# Patient Record
Sex: MALE | Race: WHITE | NOT HISPANIC OR LATINO | URBAN - METROPOLITAN AREA
[De-identification: names, ages, dates, MRNs, and addresses within clinical notes are randomized per-mention and may not be internally consistent; named-entity substitution may affect disease eponyms.]

---

## 2024-05-03 ENCOUNTER — NURSING HOME VISIT (OUTPATIENT)
Dept: POST ACUTE CARE | Facility: EXTERNAL LOCATION | Age: 74
End: 2024-05-03
Payer: MEDICARE

## 2024-05-03 VITALS
OXYGEN SATURATION: 95 % | TEMPERATURE: 98.3 F | DIASTOLIC BLOOD PRESSURE: 63 MMHG | HEART RATE: 88 BPM | RESPIRATION RATE: 18 BRPM | SYSTOLIC BLOOD PRESSURE: 134 MMHG

## 2024-05-03 DIAGNOSIS — I48.20 CHRONIC ATRIAL FIBRILLATION (MULTI): ICD-10-CM

## 2024-05-03 DIAGNOSIS — R31.0 GROSS HEMATURIA: ICD-10-CM

## 2024-05-03 DIAGNOSIS — G31.83 LEWY BODY DEMENTIA WITH BEHAVIORAL DISTURBANCE (MULTI): Primary | ICD-10-CM

## 2024-05-03 DIAGNOSIS — R64 CACHEXIA (MULTI): ICD-10-CM

## 2024-05-03 DIAGNOSIS — F02.818 LEWY BODY DEMENTIA WITH BEHAVIORAL DISTURBANCE (MULTI): Primary | ICD-10-CM

## 2024-05-03 DIAGNOSIS — E05.90 HYPERTHYROIDISM: ICD-10-CM

## 2024-05-03 DIAGNOSIS — R62.7 FAILURE TO THRIVE IN ADULT: ICD-10-CM

## 2024-05-03 PROBLEM — R31.9 HEMATURIA: Status: ACTIVE | Noted: 2024-05-03

## 2024-05-03 PROBLEM — I10 PRIMARY HYPERTENSION: Status: ACTIVE | Noted: 2024-05-03

## 2024-05-03 PROCEDURE — 99310 SBSQ NF CARE HIGH MDM 45: CPT | Performed by: NURSE PRACTITIONER

## 2024-05-03 NOTE — ASSESSMENT & PLAN NOTE
Had some gross hematuria which promoted hospitalization,  has previously known 2mm nonobstructing right renal calculus from CT 2/24.   Xarelto was held, no known recurrence of hematuria, if recurs may need to consider urology evaluation.  Staff to monitor and notify for any recurrence of  hematuria

## 2024-05-03 NOTE — ASSESSMENT & PLAN NOTE
On rivaroxaban and amiodarone.  Rivaroxaban was temporarily held in the hospital due to gross hematuria which has resolved.   Staff to monitor for any recurrence of bleeding.   Rate currently controlled.

## 2024-05-03 NOTE — ASSESSMENT & PLAN NOTE
Longstanding amiodarone use.  Was recently started on methimazole (started during hospitalization 4/15-4/17), unknown what his baseline TSH and T4 are but this may be contributing to sx.  [facility].  Consider rechecking TSH and free T4 in a few weeks.

## 2024-05-03 NOTE — PROGRESS NOTES
Huong Almeida is a 73 y.o. male Here for skilled admission due to failure to thrive, hematuria.   HPI  He has had multiple hospitalizations  (5) in the last 5 months, he resides with his wife.  He has Lewy body dementia, oriented to person and has had decreased oral intake, frequent falls.  Most recent hospitalization 4/15/-4/17 he was found to have hyperthyroidism from longstanding amiodarone use and was started on methimazole.  HE had gross hematuria at home for a few days prompting evaluation, per notes no pathology identified for hematuria, xarelto was held and hematuria resolved, xarelto has been resumed.  He previously had CT abd which showed 2mm nonobstructing renal calculi.  He has been intermittently agitated and resistant to care.  He is cachectic.  Oriented to person, will open eyes with verbal stimulation.  He will answer yes/no.  Unable to obtain any hx except from chart.  Past medical hx includes AF on xarelto, recent dx of hypoerthyroidism, Lewy Body dementia, HTN.      Labs:  4/22  WBC: 8.5  Hgb: 11.4  Hct: 34.6  Platelet: 179    Na: 140  K: 4.1  Cl: 106  Co2: 30  BUN: 30  Creatinine: 1.3      MEDS:  Amiodarone  Amlodipine  Sinemet  Escitalopram  Ferrous sulfate  Hydralazine  Lisinopril  Melatonin  Memantine  Methimazole (recently started)  Miralax  Seroquel  rivaroxaban        Review of Systems   Reason unable to perform ROS: unable to obtain, he denies any pain when asked.       Objective   /63   Pulse 88   Temp 36.8 °C (98.3 °F)   Resp 18   SpO2 95%     Physical Exam  Constitutional:       General: He is not in acute distress.     Comments: Frail, cachectic male resting in bed, arouses easily.     HENT:      Head: Normocephalic and atraumatic.      Comments: Bitemporal wasting  Eyes:      Conjunctiva/sclera: Conjunctivae normal.   Cardiovascular:      Rate and Rhythm: Normal rate and regular rhythm.      Heart sounds: Murmur (3/6 RUSB) heard.   Pulmonary:      Effort:  Pulmonary effort is normal. No respiratory distress.      Breath sounds: Normal breath sounds.   Abdominal:      General: Bowel sounds are normal. There is no distension.      Palpations: Abdomen is soft.      Tenderness: There is no abdominal tenderness.      Comments: Scaphoid     Musculoskeletal:      Right lower leg: No edema.      Left lower leg: No edema.      Comments: diffusely decreased muscle mass  Moves all extremities, weak   Skin:     General: Skin is warm and dry.   Neurological:      Mental Status: He is alert.   Psychiatric:         Behavior: Behavior normal.      Comments: Per staff was agitated and resistant to care last night.  No recurrence today         Assessment/Plan   Problem List Items Addressed This Visit       Hematuria     Had some gross hematuria which promoted hospitalization,  has previously known 2mm nonobstructing right renal calculus from CT 2/24.   Xarelto was held, no known recurrence of hematuria, if recurs may need to consider urology evaluation.  Staff to monitor and notify for any recurrence of  hematuria           Lewy body dementia with behavioral disturbance (Multi) - Primary     Oreinted times 1, will open eyes to verbal stimulation, followed simple commands inconsistently.  He resides with wife, has had 5 hospitalizations in the last 5 months.  He has had frequent falls and decreased oral intake along with increased confusion.  On seroquel.           Chronic atrial fibrillation (Multi)     On rivaroxaban and amiodarone.  Rivaroxaban was temporarily held in the hospital due to gross hematuria which has resolved.   Staff to monitor for any recurrence of bleeding.   Rate currently controlled.          Failure to thrive in adult     Unclear weight trend, per notes he has had decreased oral intake.  No clear etiology, likely progression of Lewy Body dementia         Cachexia (Multi)     Weight pending from admission.          Hyperthyroidism     Longstanding amiodarone use.   Was recently started on methimazole (started during hospitalization 4/15-4/17), unknown what his baseline TSH and T4 are but this may be contributing to sx.  [facility].  Consider rechecking TSH and free T4 in a few weeks.         labs/meds/orders reviewed  staff to monitor and notify for any changes.  Hospital records reviewed.  continue with therapy as able.  He appears weak  Await weights and also PSA and TSH requested from [facility].    Time for coordination of care was greater than 35 minutes with hospital chart review, visit and exam, discussion of treatment plan with staff.     [Negative] : Heme/Lymph

## 2024-05-03 NOTE — LETTER
Patient: Barrera Almeida  : 1950    Encounter Date: 2024    Subjective  Barrera Almeida is a 73 y.o. male Here for skilled admission due to failure to thrive, hematuria.   HPI  He has had multiple hospitalizations  (5) in the last 5 months, he resides with his wife.  He has Lewy body dementia, oriented to person and has had decreased oral intake, frequent falls.  Most recent hospitalization 4/15/- he was found to have hyperthyroidism from longstanding amiodarone use and was started on methimazole.  HE had gross hematuria at home for a few days prompting evaluation, per notes no pathology identified for hematuria, xarelto was held and hematuria resolved, xarelto has been resumed.  He previously had CT abd which showed 2mm nonobstructing renal calculi.  He has been intermittently agitated and resistant to care.  He is cachectic.  Oriented to person, will open eyes with verbal stimulation.  He will answer yes/no.  Unable to obtain any hx except from chart.  Past medical hx includes AF on xarelto, recent dx of hypoerthyroidism, Lewy Body dementia, HTN.      Labs:    WBC: 8.5  Hgb: 11.4  Hct: 34.6  Platelet: 179    Na: 140  K: 4.1  Cl: 106  Co2: 30  BUN: 30  Creatinine: 1.3      MEDS:  Amiodarone  Amlodipine  Sinemet  Escitalopram  Ferrous sulfate  Hydralazine  Lisinopril  Melatonin  Memantine  Methimazole (recently started)  Miralax  Seroquel  rivaroxaban        Review of Systems   Reason unable to perform ROS: unable to obtain, he denies any pain when asked.       Objective  /63   Pulse 88   Temp 36.8 °C (98.3 °F)   Resp 18   SpO2 95%     Physical Exam  Constitutional:       General: He is not in acute distress.     Comments: Frail, cachectic male resting in bed, arouses easily.     HENT:      Head: Normocephalic and atraumatic.      Comments: Bitemporal wasting  Eyes:      Conjunctiva/sclera: Conjunctivae normal.   Cardiovascular:      Rate and Rhythm: Normal rate and regular rhythm.       Heart sounds: Murmur (3/6 RUSB) heard.   Pulmonary:      Effort: Pulmonary effort is normal. No respiratory distress.      Breath sounds: Normal breath sounds.   Abdominal:      General: Bowel sounds are normal. There is no distension.      Palpations: Abdomen is soft.      Tenderness: There is no abdominal tenderness.      Comments: Scaphoid     Musculoskeletal:      Right lower leg: No edema.      Left lower leg: No edema.      Comments: diffusely decreased muscle mass  Moves all extremities, weak   Skin:     General: Skin is warm and dry.   Neurological:      Mental Status: He is alert.   Psychiatric:         Behavior: Behavior normal.      Comments: Per staff was agitated and resistant to care last night.  No recurrence today         Assessment/Plan  Problem List Items Addressed This Visit       Hematuria     Had some gross hematuria which promoted hospitalization,  has previously known 2mm nonobstructing right renal calculus from CT 2/24.   Xarelto was held, no known recurrence of hematuria, if recurs may need to consider urology evaluation.  Staff to monitor and notify for any recurrence of  hematuria           Lewy body dementia with behavioral disturbance (Multi) - Primary     Oreinted times 1, will open eyes to verbal stimulation, followed simple commands inconsistently.  He resides with wife, has had 5 hospitalizations in the last 5 months.  He has had frequent falls and decreased oral intake along with increased confusion.  On seroquel.           Chronic atrial fibrillation (Multi)     On rivaroxaban and amiodarone.  Rivaroxaban was temporarily held in the hospital due to gross hematuria which has resolved.   Staff to monitor for any recurrence of bleeding.   Rate currently controlled.          Failure to thrive in adult     Unclear weight trend, per notes he has had decreased oral intake.  No clear etiology, likely progression of Lewy Body dementia         Cachexia (Multi)     Weight pending from  admission.          Hyperthyroidism     Longstanding amiodarone use.  Was recently started on methimazole (started during hospitalization 4/15-4/17), unknown what his baseline TSH and T4 are but this may be contributing to sx.  [facility].  Consider rechecking TSH and free T4 in a few weeks.         labs/meds/orders reviewed  staff to monitor and notify for any changes.  Hospital records reviewed.  continue with therapy as able.  He appears weak  Await weights and also PSA and TSH requested [facility].    Time for coordination of care was greater than 35 minutes with hospital chart review, visit and exam, discussion of treatment plan with staff.      Electronically Signed By: VIRY Feldman   5/3/24  2:37 PM

## 2024-05-03 NOTE — ASSESSMENT & PLAN NOTE
Oreinted times 1, will open eyes to verbal stimulation, followed simple commands inconsistently.  He resides with wife, has had 5 hospitalizations in the last 5 months.  He has had frequent falls and decreased oral intake along with increased confusion.  On seroquel.    Per notes he has had a fairly recent rapid decline.

## 2024-05-03 NOTE — ASSESSMENT & PLAN NOTE
Unclear weight trend, per notes he has had decreased oral intake.  No clear etiology, likely progression of Lewy Body dementia

## 2024-05-05 ENCOUNTER — APPOINTMENT (OUTPATIENT)
Dept: RADIOLOGY | Facility: HOSPITAL | Age: 74
DRG: 536 | End: 2024-05-05
Payer: MEDICARE

## 2024-05-05 ENCOUNTER — HOSPITAL ENCOUNTER (OUTPATIENT)
Facility: HOSPITAL | Age: 74
Setting detail: OBSERVATION
Discharge: HOSPICE | DRG: 536 | End: 2024-05-06
Attending: STUDENT IN AN ORGANIZED HEALTH CARE EDUCATION/TRAINING PROGRAM | Admitting: STUDENT IN AN ORGANIZED HEALTH CARE EDUCATION/TRAINING PROGRAM
Payer: MEDICARE

## 2024-05-05 DIAGNOSIS — S72.143S INTERTROCHANTERIC FRACTURE OF FEMUR, SEQUELA: Primary | ICD-10-CM

## 2024-05-05 LAB
ALBUMIN SERPL BCP-MCNC: 3.4 G/DL (ref 3.4–5)
ALP SERPL-CCNC: 83 U/L (ref 33–136)
ALT SERPL W P-5'-P-CCNC: 6 U/L (ref 10–52)
ANION GAP SERPL CALC-SCNC: 8 MMOL/L (ref 10–20)
AST SERPL W P-5'-P-CCNC: 14 U/L (ref 9–39)
BASOPHILS # BLD AUTO: 0.02 X10*3/UL (ref 0–0.1)
BASOPHILS NFR BLD AUTO: 0.2 %
BILIRUB SERPL-MCNC: 0.7 MG/DL (ref 0–1.2)
BUN SERPL-MCNC: 43 MG/DL (ref 6–23)
CALCIUM SERPL-MCNC: 9 MG/DL (ref 8.6–10.3)
CHLORIDE SERPL-SCNC: 107 MMOL/L (ref 98–107)
CO2 SERPL-SCNC: 28 MMOL/L (ref 21–32)
CREAT SERPL-MCNC: 1.05 MG/DL (ref 0.5–1.3)
EGFRCR SERPLBLD CKD-EPI 2021: 74 ML/MIN/1.73M*2
EOSINOPHIL # BLD AUTO: 0.01 X10*3/UL (ref 0–0.4)
EOSINOPHIL NFR BLD AUTO: 0.1 %
ERYTHROCYTE [DISTWIDTH] IN BLOOD BY AUTOMATED COUNT: 12.5 % (ref 11.5–14.5)
GLUCOSE SERPL-MCNC: 141 MG/DL (ref 74–99)
HCT VFR BLD AUTO: 33 % (ref 41–52)
HGB BLD-MCNC: 10.4 G/DL (ref 13.5–17.5)
IMM GRANULOCYTES # BLD AUTO: 0.05 X10*3/UL (ref 0–0.5)
IMM GRANULOCYTES NFR BLD AUTO: 0.4 % (ref 0–0.9)
INR PPP: 2.9 (ref 0.9–1.1)
LACTATE SERPL-SCNC: 0.8 MMOL/L (ref 0.4–2)
LYMPHOCYTES # BLD AUTO: 0.76 X10*3/UL (ref 0.8–3)
LYMPHOCYTES NFR BLD AUTO: 5.9 %
MAGNESIUM SERPL-MCNC: 1.97 MG/DL (ref 1.6–2.4)
MCH RBC QN AUTO: 27.7 PG (ref 26–34)
MCHC RBC AUTO-ENTMCNC: 31.5 G/DL (ref 32–36)
MCV RBC AUTO: 88 FL (ref 80–100)
MONOCYTES # BLD AUTO: 0.89 X10*3/UL (ref 0.05–0.8)
MONOCYTES NFR BLD AUTO: 6.9 %
NEUTROPHILS # BLD AUTO: 11.16 X10*3/UL (ref 1.6–5.5)
NEUTROPHILS NFR BLD AUTO: 86.5 %
NRBC BLD-RTO: 0 /100 WBCS (ref 0–0)
PLATELET # BLD AUTO: 196 X10*3/UL (ref 150–450)
POTASSIUM SERPL-SCNC: 3.7 MMOL/L (ref 3.5–5.3)
PROT SERPL-MCNC: 6.1 G/DL (ref 6.4–8.2)
PROTHROMBIN TIME: 33.6 SECONDS (ref 9.8–12.8)
RBC # BLD AUTO: 3.75 X10*6/UL (ref 4.5–5.9)
SODIUM SERPL-SCNC: 139 MMOL/L (ref 136–145)
WBC # BLD AUTO: 12.9 X10*3/UL (ref 4.4–11.3)

## 2024-05-05 PROCEDURE — 2500000004 HC RX 250 GENERAL PHARMACY W/ HCPCS (ALT 636 FOR OP/ED): Performed by: STUDENT IN AN ORGANIZED HEALTH CARE EDUCATION/TRAINING PROGRAM

## 2024-05-05 PROCEDURE — 85610 PROTHROMBIN TIME: CPT | Performed by: STUDENT IN AN ORGANIZED HEALTH CARE EDUCATION/TRAINING PROGRAM

## 2024-05-05 PROCEDURE — 73502 X-RAY EXAM HIP UNI 2-3 VIEWS: CPT | Mod: RIGHT SIDE | Performed by: RADIOLOGY

## 2024-05-05 PROCEDURE — 80053 COMPREHEN METABOLIC PANEL: CPT | Performed by: STUDENT IN AN ORGANIZED HEALTH CARE EDUCATION/TRAINING PROGRAM

## 2024-05-05 PROCEDURE — 72125 CT NECK SPINE W/O DYE: CPT

## 2024-05-05 PROCEDURE — 73502 X-RAY EXAM HIP UNI 2-3 VIEWS: CPT | Mod: RT

## 2024-05-05 PROCEDURE — G0378 HOSPITAL OBSERVATION PER HR: HCPCS

## 2024-05-05 PROCEDURE — 2500000004 HC RX 250 GENERAL PHARMACY W/ HCPCS (ALT 636 FOR OP/ED)

## 2024-05-05 PROCEDURE — 85025 COMPLETE CBC W/AUTO DIFF WBC: CPT | Performed by: STUDENT IN AN ORGANIZED HEALTH CARE EDUCATION/TRAINING PROGRAM

## 2024-05-05 PROCEDURE — 72125 CT NECK SPINE W/O DYE: CPT | Performed by: RADIOLOGY

## 2024-05-05 PROCEDURE — 1100000001 HC PRIVATE ROOM DAILY

## 2024-05-05 PROCEDURE — 2500000004 HC RX 250 GENERAL PHARMACY W/ HCPCS (ALT 636 FOR OP/ED): Performed by: INTERNAL MEDICINE

## 2024-05-05 PROCEDURE — 70450 CT HEAD/BRAIN W/O DYE: CPT | Performed by: RADIOLOGY

## 2024-05-05 PROCEDURE — 70450 CT HEAD/BRAIN W/O DYE: CPT

## 2024-05-05 PROCEDURE — 2500000001 HC RX 250 WO HCPCS SELF ADMINISTERED DRUGS (ALT 637 FOR MEDICARE OP): Performed by: STUDENT IN AN ORGANIZED HEALTH CARE EDUCATION/TRAINING PROGRAM

## 2024-05-05 PROCEDURE — 99285 EMERGENCY DEPT VISIT HI MDM: CPT | Mod: 25

## 2024-05-05 PROCEDURE — 99222 1ST HOSP IP/OBS MODERATE 55: CPT | Performed by: STUDENT IN AN ORGANIZED HEALTH CARE EDUCATION/TRAINING PROGRAM

## 2024-05-05 PROCEDURE — 36415 COLL VENOUS BLD VENIPUNCTURE: CPT | Performed by: STUDENT IN AN ORGANIZED HEALTH CARE EDUCATION/TRAINING PROGRAM

## 2024-05-05 PROCEDURE — 83735 ASSAY OF MAGNESIUM: CPT | Performed by: STUDENT IN AN ORGANIZED HEALTH CARE EDUCATION/TRAINING PROGRAM

## 2024-05-05 PROCEDURE — 83605 ASSAY OF LACTIC ACID: CPT | Performed by: STUDENT IN AN ORGANIZED HEALTH CARE EDUCATION/TRAINING PROGRAM

## 2024-05-05 PROCEDURE — 96374 THER/PROPH/DIAG INJ IV PUSH: CPT

## 2024-05-05 RX ORDER — CARBIDOPA AND LEVODOPA 50; 200 MG/1; MG/1
1 TABLET, EXTENDED RELEASE ORAL NIGHTLY
COMMUNITY

## 2024-05-05 RX ORDER — QUETIAPINE FUMARATE 25 MG/1
25 TABLET, FILM COATED ORAL NIGHTLY
Status: DISCONTINUED | OUTPATIENT
Start: 2024-05-05 | End: 2024-05-06 | Stop reason: HOSPADM

## 2024-05-05 RX ORDER — MEMANTINE HYDROCHLORIDE 10 MG/1
10 TABLET ORAL 2 TIMES DAILY
COMMUNITY

## 2024-05-05 RX ORDER — FERROUS SULFATE 325(65) MG
325 TABLET ORAL EVERY OTHER DAY
COMMUNITY

## 2024-05-05 RX ORDER — AMLODIPINE BESYLATE 5 MG/1
5 TABLET ORAL EVERY MORNING
Status: DISCONTINUED | OUTPATIENT
Start: 2024-05-05 | End: 2024-05-06 | Stop reason: HOSPADM

## 2024-05-05 RX ORDER — POLYETHYLENE GLYCOL 3350 17 G/17G
17 POWDER, FOR SOLUTION ORAL NIGHTLY
COMMUNITY

## 2024-05-05 RX ORDER — DOXYLAMINE SUCCINATE 25 MG
1 TABLET ORAL 3 TIMES DAILY
Status: DISCONTINUED | OUTPATIENT
Start: 2024-05-05 | End: 2024-05-06 | Stop reason: HOSPADM

## 2024-05-05 RX ORDER — HYDRALAZINE HYDROCHLORIDE 10 MG/1
10 TABLET, FILM COATED ORAL 2 TIMES DAILY
COMMUNITY

## 2024-05-05 RX ORDER — ACETAMINOPHEN 325 MG/1
650 TABLET ORAL EVERY 6 HOURS PRN
COMMUNITY

## 2024-05-05 RX ORDER — DOXYLAMINE SUCCINATE 25 MG
1 TABLET ORAL 3 TIMES DAILY
COMMUNITY

## 2024-05-05 RX ORDER — LISINOPRIL 40 MG/1
40 TABLET ORAL EVERY MORNING
COMMUNITY

## 2024-05-05 RX ORDER — CARBIDOPA AND LEVODOPA 25; 100 MG/1; MG/1
1 TABLET ORAL 2 TIMES DAILY
COMMUNITY

## 2024-05-05 RX ORDER — SODIUM CHLORIDE, SODIUM LACTATE, POTASSIUM CHLORIDE, CALCIUM CHLORIDE 600; 310; 30; 20 MG/100ML; MG/100ML; MG/100ML; MG/100ML
100 INJECTION, SOLUTION INTRAVENOUS CONTINUOUS
Status: ACTIVE | OUTPATIENT
Start: 2024-05-05 | End: 2024-05-06

## 2024-05-05 RX ORDER — HYDRALAZINE HYDROCHLORIDE 10 MG/1
10 TABLET, FILM COATED ORAL 2 TIMES DAILY
Status: DISCONTINUED | OUTPATIENT
Start: 2024-05-05 | End: 2024-05-06 | Stop reason: HOSPADM

## 2024-05-05 RX ORDER — ESCITALOPRAM OXALATE 10 MG/1
10 TABLET ORAL EVERY MORNING
Status: DISCONTINUED | OUTPATIENT
Start: 2024-05-05 | End: 2024-05-06 | Stop reason: HOSPADM

## 2024-05-05 RX ORDER — TALC
6 POWDER (GRAM) TOPICAL NIGHTLY
COMMUNITY

## 2024-05-05 RX ORDER — CARBIDOPA AND LEVODOPA 25; 100 MG/1; MG/1
1 TABLET ORAL
Status: DISCONTINUED | OUTPATIENT
Start: 2024-05-05 | End: 2024-05-06 | Stop reason: HOSPADM

## 2024-05-05 RX ORDER — MEMANTINE HYDROCHLORIDE 10 MG/1
10 TABLET ORAL 2 TIMES DAILY
Status: DISCONTINUED | OUTPATIENT
Start: 2024-05-05 | End: 2024-05-06 | Stop reason: HOSPADM

## 2024-05-05 RX ORDER — CARBIDOPA AND LEVODOPA 50; 200 MG/1; MG/1
1 TABLET, EXTENDED RELEASE ORAL NIGHTLY
Status: DISCONTINUED | OUTPATIENT
Start: 2024-05-05 | End: 2024-05-06 | Stop reason: HOSPADM

## 2024-05-05 RX ORDER — QUETIAPINE FUMARATE 25 MG/1
25 TABLET, FILM COATED ORAL NIGHTLY
COMMUNITY

## 2024-05-05 RX ORDER — ESCITALOPRAM OXALATE 10 MG/1
10 TABLET ORAL EVERY MORNING
COMMUNITY

## 2024-05-05 RX ORDER — POLYETHYLENE GLYCOL 3350 17 G/17G
17 POWDER, FOR SOLUTION ORAL NIGHTLY
Status: DISCONTINUED | OUTPATIENT
Start: 2024-05-05 | End: 2024-05-06 | Stop reason: HOSPADM

## 2024-05-05 RX ORDER — AMOXICILLIN 250 MG
1 CAPSULE ORAL 2 TIMES DAILY PRN
COMMUNITY

## 2024-05-05 RX ORDER — METHIMAZOLE 5 MG/1
5 TABLET ORAL 2 TIMES DAILY
Status: DISCONTINUED | OUTPATIENT
Start: 2024-05-05 | End: 2024-05-06 | Stop reason: HOSPADM

## 2024-05-05 RX ORDER — AMIODARONE HYDROCHLORIDE 100 MG/1
100 TABLET ORAL EVERY MORNING
COMMUNITY

## 2024-05-05 RX ORDER — LORAZEPAM 2 MG/ML
1 INJECTION INTRAMUSCULAR ONCE
Status: COMPLETED | OUTPATIENT
Start: 2024-05-05 | End: 2024-05-05

## 2024-05-05 RX ORDER — AMOXICILLIN 250 MG
1 CAPSULE ORAL 2 TIMES DAILY PRN
Status: DISCONTINUED | OUTPATIENT
Start: 2024-05-05 | End: 2024-05-06 | Stop reason: HOSPADM

## 2024-05-05 RX ORDER — METHIMAZOLE 5 MG/1
5 TABLET ORAL 2 TIMES DAILY
COMMUNITY

## 2024-05-05 RX ORDER — LISINOPRIL 40 MG/1
40 TABLET ORAL EVERY MORNING
Status: DISCONTINUED | OUTPATIENT
Start: 2024-05-05 | End: 2024-05-06 | Stop reason: HOSPADM

## 2024-05-05 RX ORDER — SODIUM CHLORIDE, SODIUM LACTATE, POTASSIUM CHLORIDE, CALCIUM CHLORIDE 600; 310; 30; 20 MG/100ML; MG/100ML; MG/100ML; MG/100ML
100 INJECTION, SOLUTION INTRAVENOUS CONTINUOUS
Status: ACTIVE | OUTPATIENT
Start: 2024-05-05 | End: 2024-05-05

## 2024-05-05 RX ORDER — AMIODARONE HYDROCHLORIDE 200 MG/1
100 TABLET ORAL EVERY MORNING
Status: DISCONTINUED | OUTPATIENT
Start: 2024-05-05 | End: 2024-05-06 | Stop reason: HOSPADM

## 2024-05-05 RX ORDER — AMLODIPINE BESYLATE 5 MG/1
5 TABLET ORAL EVERY MORNING
COMMUNITY

## 2024-05-05 RX ORDER — KETOROLAC TROMETHAMINE 15 MG/ML
15 INJECTION, SOLUTION INTRAMUSCULAR; INTRAVENOUS EVERY 6 HOURS
Status: DISCONTINUED | OUTPATIENT
Start: 2024-05-05 | End: 2024-05-06 | Stop reason: HOSPADM

## 2024-05-05 RX ORDER — TALC
6 POWDER (GRAM) TOPICAL NIGHTLY
Status: DISCONTINUED | OUTPATIENT
Start: 2024-05-05 | End: 2024-05-06 | Stop reason: HOSPADM

## 2024-05-05 RX ADMIN — MICONAZOLE NITRATE 1 APPLICATION: 20 CREAM TOPICAL at 14:35

## 2024-05-05 RX ADMIN — SODIUM CHLORIDE, POTASSIUM CHLORIDE, SODIUM LACTATE AND CALCIUM CHLORIDE 100 ML/HR: 600; 310; 30; 20 INJECTION, SOLUTION INTRAVENOUS at 13:38

## 2024-05-05 RX ADMIN — SODIUM CHLORIDE, POTASSIUM CHLORIDE, SODIUM LACTATE AND CALCIUM CHLORIDE 100 ML/HR: 600; 310; 30; 20 INJECTION, SOLUTION INTRAVENOUS at 06:01

## 2024-05-05 RX ADMIN — SODIUM CHLORIDE, POTASSIUM CHLORIDE, SODIUM LACTATE AND CALCIUM CHLORIDE 100 ML/HR: 600; 310; 30; 20 INJECTION, SOLUTION INTRAVENOUS at 23:06

## 2024-05-05 RX ADMIN — HYDROMORPHONE HYDROCHLORIDE 0.5 MG: 1 INJECTION, SOLUTION INTRAMUSCULAR; INTRAVENOUS; SUBCUTANEOUS at 13:42

## 2024-05-05 RX ADMIN — MICONAZOLE NITRATE 1 APPLICATION: 20 CREAM TOPICAL at 21:22

## 2024-05-05 RX ADMIN — LORAZEPAM 1 MG: 2 INJECTION, SOLUTION INTRAMUSCULAR; INTRAVENOUS at 12:15

## 2024-05-05 RX ADMIN — HYDROMORPHONE HYDROCHLORIDE 0.5 MG: 1 INJECTION, SOLUTION INTRAMUSCULAR; INTRAVENOUS; SUBCUTANEOUS at 06:01

## 2024-05-05 RX ADMIN — KETOROLAC TROMETHAMINE 15 MG: 15 INJECTION, SOLUTION INTRAMUSCULAR; INTRAVENOUS at 20:40

## 2024-05-05 RX ADMIN — KETOROLAC TROMETHAMINE 15 MG: 15 INJECTION, SOLUTION INTRAMUSCULAR; INTRAVENOUS at 14:40

## 2024-05-05 SDOH — SOCIAL STABILITY: SOCIAL INSECURITY: DO YOU FEEL UNSAFE GOING BACK TO THE PLACE WHERE YOU ARE LIVING?: UNABLE TO ASSESS

## 2024-05-05 SDOH — ECONOMIC STABILITY: INCOME INSECURITY
IN THE LAST 12 MONTHS, WAS THERE A TIME WHEN YOU WERE NOT ABLE TO PAY THE MORTGAGE OR RENT ON TIME?: PATIENT UNABLE TO ANSWER

## 2024-05-05 SDOH — HEALTH STABILITY: MENTAL HEALTH: HOW OFTEN DO YOU HAVE 6 OR MORE DRINKS ON ONE OCCASION?: NEVER

## 2024-05-05 SDOH — SOCIAL STABILITY: SOCIAL NETWORK: HOW OFTEN DO YOU ATTENT MEETINGS OF THE CLUB OR ORGANIZATION YOU BELONG TO?: PATIENT UNABLE TO ANSWER

## 2024-05-05 SDOH — ECONOMIC STABILITY: INCOME INSECURITY
HOW HARD IS IT FOR YOU TO PAY FOR THE VERY BASICS LIKE FOOD, HOUSING, MEDICAL CARE, AND HEATING?: PATIENT UNABLE TO ANSWER

## 2024-05-05 SDOH — SOCIAL STABILITY: SOCIAL INSECURITY
WITHIN THE LAST YEAR, HAVE YOU BEEN KICKED, HIT, SLAPPED, OR OTHERWISE PHYSICALLY HURT BY YOUR PARTNER OR EX-PARTNER?: PATIENT UNABLE TO ANSWER

## 2024-05-05 SDOH — SOCIAL STABILITY: SOCIAL INSECURITY: DOES ANYONE TRY TO KEEP YOU FROM HAVING/CONTACTING OTHER FRIENDS OR DOING THINGS OUTSIDE YOUR HOME?: UNABLE TO ASSESS

## 2024-05-05 SDOH — SOCIAL STABILITY: SOCIAL NETWORK: HOW OFTEN DO YOU ATTEND CHURCH OR RELIGIOUS SERVICES?: PATIENT UNABLE TO ANSWER

## 2024-05-05 SDOH — SOCIAL STABILITY: SOCIAL NETWORK: IN A TYPICAL WEEK, HOW MANY TIMES DO YOU TALK ON THE PHONE WITH FAMILY, FRIENDS, OR NEIGHBORS?: PATIENT UNABLE TO ANSWER

## 2024-05-05 SDOH — SOCIAL STABILITY: SOCIAL NETWORK: HOW OFTEN DO YOU GET TOGETHER WITH FRIENDS OR RELATIVES?: PATIENT UNABLE TO ANSWER

## 2024-05-05 SDOH — ECONOMIC STABILITY: INCOME INSECURITY
IN THE PAST 12 MONTHS, HAS THE ELECTRIC, GAS, OIL, OR WATER COMPANY THREATENED TO SHUT OFF SERVICE IN YOUR HOME?: PATIENT UNABLE TO ANSWER

## 2024-05-05 SDOH — HEALTH STABILITY: MENTAL HEALTH
HOW OFTEN DO YOU NEED TO HAVE SOMEONE HELP YOU WHEN YOU READ INSTRUCTIONS, PAMPHLETS, OR OTHER WRITTEN MATERIAL FROM YOUR DOCTOR OR PHARMACY?: PATIENT UNABLE TO RESPOND

## 2024-05-05 SDOH — SOCIAL STABILITY: SOCIAL INSECURITY: ABUSE: ADULT

## 2024-05-05 SDOH — SOCIAL STABILITY: SOCIAL NETWORK: ARE YOU MARRIED, WIDOWED, DIVORCED, SEPARATED, NEVER MARRIED, OR LIVING WITH A PARTNER?: PATIENT UNABLE TO ANSWER

## 2024-05-05 SDOH — SOCIAL STABILITY: SOCIAL NETWORK
DO YOU BELONG TO ANY CLUBS OR ORGANIZATIONS SUCH AS CHURCH GROUPS UNIONS, FRATERNAL OR ATHLETIC GROUPS, OR SCHOOL GROUPS?: PATIENT UNABLE TO ANSWER

## 2024-05-05 SDOH — ECONOMIC STABILITY: HOUSING INSECURITY: IN THE LAST 12 MONTHS, HOW MANY PLACES HAVE YOU LIVED?: 2

## 2024-05-05 SDOH — ECONOMIC STABILITY: HOUSING INSECURITY
IN THE LAST 12 MONTHS, WAS THERE A TIME WHEN YOU DID NOT HAVE A STEADY PLACE TO SLEEP OR SLEPT IN A SHELTER (INCLUDING NOW)?: PATIENT UNABLE TO ANSWER

## 2024-05-05 SDOH — SOCIAL STABILITY: SOCIAL INSECURITY: WERE YOU ABLE TO COMPLETE ALL THE BEHAVIORAL HEALTH SCREENINGS?: NO

## 2024-05-05 SDOH — HEALTH STABILITY: MENTAL HEALTH: HOW OFTEN DO YOU HAVE A DRINK CONTAINING ALCOHOL?: NEVER

## 2024-05-05 SDOH — SOCIAL STABILITY: SOCIAL INSECURITY: DO YOU FEEL ANYONE HAS EXPLOITED OR TAKEN ADVANTAGE OF YOU FINANCIALLY OR OF YOUR PERSONAL PROPERTY?: UNABLE TO ASSESS

## 2024-05-05 SDOH — SOCIAL STABILITY: SOCIAL INSECURITY
WITHIN THE LAST YEAR, HAVE TO BEEN RAPED OR FORCED TO HAVE ANY KIND OF SEXUAL ACTIVITY BY YOUR PARTNER OR EX-PARTNER?: PATIENT UNABLE TO ANSWER

## 2024-05-05 SDOH — SOCIAL STABILITY: SOCIAL INSECURITY: ARE THERE ANY APPARENT SIGNS OF INJURIES/BEHAVIORS THAT COULD BE RELATED TO ABUSE/NEGLECT?: UNABLE TO ASSESS

## 2024-05-05 SDOH — HEALTH STABILITY: MENTAL HEALTH
STRESS IS WHEN SOMEONE FEELS TENSE, NERVOUS, ANXIOUS, OR CAN'T SLEEP AT NIGHT BECAUSE THEIR MIND IS TROUBLED. HOW STRESSED ARE YOU?: PATIENT UNABLE TO ANSWER

## 2024-05-05 SDOH — ECONOMIC STABILITY: TRANSPORTATION INSECURITY
IN THE PAST 12 MONTHS, HAS THE LACK OF TRANSPORTATION KEPT YOU FROM MEDICAL APPOINTMENTS OR FROM GETTING MEDICATIONS?: PATIENT UNABLE TO ANSWER

## 2024-05-05 SDOH — SOCIAL STABILITY: SOCIAL INSECURITY: HAVE YOU HAD THOUGHTS OF HARMING ANYONE ELSE?: UNABLE TO ASSESS

## 2024-05-05 SDOH — HEALTH STABILITY: PHYSICAL HEALTH: ON AVERAGE, HOW MANY DAYS PER WEEK DO YOU ENGAGE IN MODERATE TO STRENUOUS EXERCISE (LIKE A BRISK WALK)?: 0 DAYS

## 2024-05-05 SDOH — HEALTH STABILITY: PHYSICAL HEALTH: ON AVERAGE, HOW MANY MINUTES DO YOU ENGAGE IN EXERCISE AT THIS LEVEL?: 0 MIN

## 2024-05-05 SDOH — SOCIAL STABILITY: SOCIAL INSECURITY
WITHIN THE LAST YEAR, HAVE YOU BEEN HUMILIATED OR EMOTIONALLY ABUSED IN OTHER WAYS BY YOUR PARTNER OR EX-PARTNER?: PATIENT UNABLE TO ANSWER

## 2024-05-05 SDOH — ECONOMIC STABILITY: FOOD INSECURITY
WITHIN THE PAST 12 MONTHS, YOU WORRIED THAT YOUR FOOD WOULD RUN OUT BEFORE YOU GOT MONEY TO BUY MORE.: PATIENT UNABLE TO ANSWER

## 2024-05-05 SDOH — ECONOMIC STABILITY: TRANSPORTATION INSECURITY
IN THE PAST 12 MONTHS, HAS LACK OF TRANSPORTATION KEPT YOU FROM MEETINGS, WORK, OR FROM GETTING THINGS NEEDED FOR DAILY LIVING?: PATIENT UNABLE TO ANSWER

## 2024-05-05 SDOH — SOCIAL STABILITY: SOCIAL INSECURITY: WITHIN THE LAST YEAR, HAVE YOU BEEN AFRAID OF YOUR PARTNER OR EX-PARTNER?: PATIENT UNABLE TO ANSWER

## 2024-05-05 SDOH — HEALTH STABILITY: MENTAL HEALTH: HOW MANY STANDARD DRINKS CONTAINING ALCOHOL DO YOU HAVE ON A TYPICAL DAY?: PATIENT DOES NOT DRINK

## 2024-05-05 SDOH — SOCIAL STABILITY: SOCIAL INSECURITY: HAVE YOU HAD ANY THOUGHTS OF HARMING ANYONE ELSE?: UNABLE TO ASSESS

## 2024-05-05 SDOH — SOCIAL STABILITY: SOCIAL INSECURITY: ARE YOU OR HAVE YOU BEEN THREATENED OR ABUSED PHYSICALLY, EMOTIONALLY, OR SEXUALLY BY ANYONE?: UNABLE TO ASSESS

## 2024-05-05 SDOH — SOCIAL STABILITY: SOCIAL INSECURITY: HAS ANYONE EVER THREATENED TO HURT YOUR FAMILY OR YOUR PETS?: UNABLE TO ASSESS

## 2024-05-05 SDOH — ECONOMIC STABILITY: FOOD INSECURITY
WITHIN THE PAST 12 MONTHS, THE FOOD YOU BOUGHT JUST DIDN'T LAST AND YOU DIDN'T HAVE MONEY TO GET MORE.: PATIENT UNABLE TO ANSWER

## 2024-05-05 ASSESSMENT — COGNITIVE AND FUNCTIONAL STATUS - GENERAL
PERSONAL GROOMING: TOTAL
HELP NEEDED FOR BATHING: TOTAL
TOILETING: TOTAL
TURNING FROM BACK TO SIDE WHILE IN FLAT BAD: TOTAL
EATING MEALS: TOTAL
MOBILITY SCORE: 6
MOBILITY SCORE: 6
EATING MEALS: TOTAL
MOVING TO AND FROM BED TO CHAIR: TOTAL
WALKING IN HOSPITAL ROOM: TOTAL
TURNING FROM BACK TO SIDE WHILE IN FLAT BAD: TOTAL
PERSONAL GROOMING: TOTAL
CLIMB 3 TO 5 STEPS WITH RAILING: TOTAL
DRESSING REGULAR LOWER BODY CLOTHING: TOTAL
STANDING UP FROM CHAIR USING ARMS: TOTAL
DRESSING REGULAR LOWER BODY CLOTHING: TOTAL
DAILY ACTIVITIY SCORE: 6
DRESSING REGULAR UPPER BODY CLOTHING: TOTAL
WALKING IN HOSPITAL ROOM: TOTAL
PATIENT BASELINE BEDBOUND: YES
DRESSING REGULAR UPPER BODY CLOTHING: TOTAL
CLIMB 3 TO 5 STEPS WITH RAILING: TOTAL
STANDING UP FROM CHAIR USING ARMS: TOTAL
DAILY ACTIVITIY SCORE: 6
MOVING FROM LYING ON BACK TO SITTING ON SIDE OF FLAT BED WITH BEDRAILS: TOTAL
HELP NEEDED FOR BATHING: TOTAL
MOVING TO AND FROM BED TO CHAIR: TOTAL
TOILETING: TOTAL
MOVING FROM LYING ON BACK TO SITTING ON SIDE OF FLAT BED WITH BEDRAILS: TOTAL

## 2024-05-05 ASSESSMENT — LIFESTYLE VARIABLES
SUBSTANCE_ABUSE_PAST_12_MONTHS: NO
HOW OFTEN DO YOU HAVE 6 OR MORE DRINKS ON ONE OCCASION: NEVER
TOTAL SCORE: 0
PRESCIPTION_ABUSE_PAST_12_MONTHS: NO
HAVE YOU EVER FELT YOU SHOULD CUT DOWN ON YOUR DRINKING: NO
SKIP TO QUESTIONS 9-10: 1
AUDIT-C TOTAL SCORE: 0
HOW MANY STANDARD DRINKS CONTAINING ALCOHOL DO YOU HAVE ON A TYPICAL DAY: PATIENT DOES NOT DRINK
EVER FELT BAD OR GUILTY ABOUT YOUR DRINKING: NO
AUDIT-C TOTAL SCORE: 0
AUDIT-C TOTAL SCORE: 0
HOW OFTEN DO YOU HAVE A DRINK CONTAINING ALCOHOL: NEVER
HAVE PEOPLE ANNOYED YOU BY CRITICIZING YOUR DRINKING: NO
SKIP TO QUESTIONS 9-10: 1
EVER HAD A DRINK FIRST THING IN THE MORNING TO STEADY YOUR NERVES TO GET RID OF A HANGOVER: NO

## 2024-05-05 ASSESSMENT — PAIN SCALES - PAIN ASSESSMENT IN ADVANCED DEMENTIA (PAINAD)
TOTALSCORE: 1
FACIALEXPRESSION: SMILING OR INEXPRESSIVE
BREATHING: NORMAL
FACIALEXPRESSION: SMILING OR INEXPRESSIVE
TOTALSCORE: 0
FACIALEXPRESSION: SMILING OR INEXPRESSIVE
CONSOLABILITY: DISTRACTED OR REASSURED BY VOICE/TOUCH
NEGVOCALIZATION: OCCASIONAL MOAN/GROAN, LOW SPEECH, NEGATIVE/DISAPPROVING QUALITY
BREATHING: NORMAL
BODYLANGUAGE: RELAXED
BREATHING: OCCASIONAL LABORED BREATHING, SHORT PERIOD OF HYPERVENTILATION
BODYLANGUAGE: RELAXED
CONSOLABILITY: NO NEED TO CONSOLE
BREATHING: OCCASIONAL LABORED BREATHING, SHORT PERIOD OF HYPERVENTILATION
BODYLANGUAGE: TENSE, DISTRESSED PACING, FIDGETING
CONSOLABILITY: DISTRACTED OR REASSURED BY VOICE/TOUCH
TOTALSCORE: DECREASED PAIN
TOTALSCORE: 0
BODYLANGUAGE: RELAXED
CONSOLABILITY: NO NEED TO CONSOLE
BREATHING: OCCASIONAL LABORED BREATHING, SHORT PERIOD OF HYPERVENTILATION
CONSOLABILITY: NO NEED TO CONSOLE
BREATHING: OCCASIONAL LABORED BREATHING, SHORT PERIOD OF HYPERVENTILATION
FACIALEXPRESSION: SAD, FRIGHTENED, FROWN
BODYLANGUAGE: RIGID, FISTS CLENCHED, KNEES UP, PUSHING/PULLING AWAY, STRIKES OUT
TOTALSCORE: 5
FACIALEXPRESSION: SMILING OR INEXPRESSIVE
TOTALSCORE: 0
CONSOLABILITY: NO NEED TO CONSOLE
BREATHING: NORMAL
CONSOLABILITY: NO NEED TO CONSOLE
TOTALSCORE: 1
TOTALSCORE: 7
FACIALEXPRESSION: FACIAL GRIMACING
NEGVOCALIZATION: OCCASIONAL MOAN/GROAN, LOW SPEECH, NEGATIVE/DISAPPROVING QUALITY
FACIALEXPRESSION: SMILING OR INEXPRESSIVE
BODYLANGUAGE: RELAXED
BODYLANGUAGE: RELAXED

## 2024-05-05 ASSESSMENT — PAIN SCALES - GENERAL: PAINLEVEL_OUTOF10: 0 - NO PAIN

## 2024-05-05 ASSESSMENT — ACTIVITIES OF DAILY LIVING (ADL)
ADEQUATE_TO_COMPLETE_ADL: NO
HEARING - LEFT EAR: FUNCTIONAL
ASSISTIVE_DEVICE: WHEELCHAIR;WALKER
FEEDING YOURSELF: DEPENDENT
DRESSING YOURSELF: DEPENDENT
TOILETING: DEPENDENT
GROOMING: DEPENDENT
PATIENT'S MEMORY ADEQUATE TO SAFELY COMPLETE DAILY ACTIVITIES?: NO
HEARING - RIGHT EAR: FUNCTIONAL
WALKS IN HOME: DEPENDENT
BATHING: DEPENDENT
JUDGMENT_ADEQUATE_SAFELY_COMPLETE_DAILY_ACTIVITIES: NO

## 2024-05-05 ASSESSMENT — COLUMBIA-SUICIDE SEVERITY RATING SCALE - C-SSRS
6. HAVE YOU EVER DONE ANYTHING, STARTED TO DO ANYTHING, OR PREPARED TO DO ANYTHING TO END YOUR LIFE?: NO
2. HAVE YOU ACTUALLY HAD ANY THOUGHTS OF KILLING YOURSELF?: NO
1. IN THE PAST MONTH, HAVE YOU WISHED YOU WERE DEAD OR WISHED YOU COULD GO TO SLEEP AND NOT WAKE UP?: NO

## 2024-05-05 ASSESSMENT — PAIN - FUNCTIONAL ASSESSMENT
PAIN_FUNCTIONAL_ASSESSMENT: PAINAD (PAIN ASSESSMENT IN ADVANCED DEMENTIA SCALE)
PAIN_FUNCTIONAL_ASSESSMENT: PAINAD (PAIN ASSESSMENT IN ADVANCED DEMENTIA SCALE)

## 2024-05-05 ASSESSMENT — PATIENT HEALTH QUESTIONNAIRE - PHQ9
SUM OF ALL RESPONSES TO PHQ9 QUESTIONS 1 & 2: 0
2. FEELING DOWN, DEPRESSED OR HOPELESS: NOT AT ALL
1. LITTLE INTEREST OR PLEASURE IN DOING THINGS: NOT AT ALL

## 2024-05-05 NOTE — NURSING NOTE
1215 Patient given iv ativan for tremors. Patient appears comfortable at this time. Patient unable to take oral meds.

## 2024-05-05 NOTE — CONSULTS
Reason For Consult  Right intertrochanteric femur fracture    History Of Present Illness  Barrera Almeida is a 73 y.o. male presenting with a right hip fracture.  Patient has a past medical history significant for severe Parkinson's/Lewy body dementia and atrial fibrillation for which he is on Xarelto.  [facility] where he has been admitted for 3 days.  He was reportedly up out of bed and when the nurses were trying to get him back in bed last night he fell.  He was brought to [facility] emergency department where he was found to have a right intertrochanteric femur fracture.  History is obtained from his family at bedside but the patient has had a major decline in his health status over the past few months with numerous falls.  His dementia is quite severe and he has experienced increased weight loss due to poor oral intake.  CT scan of the head and neck was performed in the emergency department and did not reveal any obvious bleeds or fractures.  Patient was admitted to a medical service with plans to discuss possible operative versus palliative care/hospice.  His family reports that they are still unsure of how they wish to proceed.     Past Medical History  He has a past medical history of Parkinson disease (Multi).    Surgical History  He has no past surgical history on file.     Social History  He has no history on file for tobacco use, alcohol use, and drug use.    Family History  No family history on file.     Allergies  Venlafaxine     Physical Exam  Patient is somewhat responsive to stimuli but is not alert and oriented.  He exhibits tremoring of all 4 extremities consistent with his Parkinson's.  There is no obvious ecchymosis or bruising around the right hip.  Patient does appear to experience some discomfort with logroll.  There does appear to be some tenderness to palpation  "about the hip but no obvious tenderness to palpation throughout the remainder of the right lower extremity.  There is no obvious point tenderness to palpation over the remainder of the extremities.  No obvious additional ecchymosis and bruising.  Patient appears to be spontaneously moving all 4 extremities.  DP pulses present in the right lower extremity.  Spontaneously plantar flexes and dorsiflexes the foot.  Unable to assess sensation secondary to patient condition.     Last Recorded Vitals  Blood pressure 114/69, pulse 67, temperature 37.1 °C (98.8 °F), temperature source Temporal, resp. rate 18, height 1.803 m (5' 11\"), weight 65.8 kg (145 lb), SpO2 99%.    Relevant Results  CT head wo IV contrast    Result Date: 5/5/2024  Interpreted By:  Finkelstein, Evan, STUDY: CT HEAD WO IV CONTRAST; CT CERVICAL SPINE WO IV CONTRAST;  5/5/2024 4:25 am   INDICATION: Signs/Symptoms:Fall head injury; Signs/Symptoms:Fall injury.   COMPARISON: None.   ACCESSION NUMBER(S): [id number]; [id number]   ORDERING CLINICIAN: DYAN CALDERON   TECHNIQUE: Noncontrast CT images of the head with coronal and sagittal reconstructions. Axial noncontrast CT images of the cervical spine with coronal and sagittal reconstructed images.   FINDINGS: EXTRACRANIAL SOFT TISSUES: Unremarkable.   CALVARIUM: No depressed skull fracture. No destructive osseous lesion.   PARANASAL SINUSES/MASTOIDS: The visualized paranasal sinuses and mastoid air cells are aerated.   HEMORRHAGE: No acute intracranial hemorrhage.   BRAIN PARENCHYMA: Gray-white matter interfaces are preserved. No mass effect or midline shift. There are nonspecific scattered white matter hypodensities.   VENTRICLES and EXTRA-AXIAL SPACES: Normal size.   OTHER FINDINGS: There are calcifications within the cavernous carotids   CERVICAL SPINE:   ALIGNMENT: No traumatic malalignment VERTEBRAE: No acute loss of vertebral body height. Prominent ventral osteophytes from C2 through C7. DISC SPACE: " Disc space narrowing C4/C5 and C5/C6. SPINAL CANAL: Multilevel facet and uncovertebral arthropathy with varying degrees of neural foraminal stenosis. No severe central narrowing. PREVERTEBRAL SOFT TISSUES: No prevertebral soft tissue swelling. LUNG APICES: Biapical pleural thickening/scarring.   OTHER FINDINGS: There are hypodense nodules in the thyroid, largest in the left lobe measuring up to 1.4 cm. There are vascular calcifications throughout the neck.         No acute intracranial hemorrhage, mass effect or midline shift.   Nonspecific scattered white matter hypodensities favored to represent sequela of small vessel ischemia. Cervical spondylosis without acute loss of vertebral body height or traumatic malalignment.   Hypodense nodules in the thyroid, largest in the left lobe measuring up to 1.4 cm. Recommend nonemergent targeted ultrasound to further characterize.   MACRO: Critical Finding:  See findings. Notification was initiated on 5/5/2024 at 5:23 am by  Evan Finkelstein.  (**-YCF-**) Instructions:   Signed by: Evan Finkelstein 5/5/2024 5:23 AM Dictation workstation:   [id number]    CT cervical spine wo IV contrast    Result Date: 5/5/2024  Interpreted By:  Finkelstein, Evan, STUDY: CT HEAD WO IV CONTRAST; CT CERVICAL SPINE WO IV CONTRAST;  5/5/2024 4:25 am   INDICATION: Signs/Symptoms:Fall head injury; Signs/Symptoms:Fall injury.   COMPARISON: None.   ACCESSION NUMBER(S): [id number]; [id number]   ORDERING CLINICIAN: DYAN CALDERON   TECHNIQUE: Noncontrast CT images of the head with coronal and sagittal reconstructions. Axial noncontrast CT images of the cervical spine with coronal and sagittal reconstructed images.   FINDINGS: EXTRACRANIAL SOFT TISSUES: Unremarkable.   CALVARIUM: No depressed skull fracture. No destructive osseous lesion.   PARANASAL SINUSES/MASTOIDS: The visualized paranasal sinuses and mastoid air cells are aerated.   HEMORRHAGE: No acute intracranial hemorrhage.   BRAIN  PARENCHYMA: Gray-white matter interfaces are preserved. No mass effect or midline shift. There are nonspecific scattered white matter hypodensities.   VENTRICLES and EXTRA-AXIAL SPACES: Normal size.   OTHER FINDINGS: There are calcifications within the cavernous carotids   CERVICAL SPINE:   ALIGNMENT: No traumatic malalignment VERTEBRAE: No acute loss of vertebral body height. Prominent ventral osteophytes from C2 through C7. DISC SPACE: Disc space narrowing C4/C5 and C5/C6. SPINAL CANAL: Multilevel facet and uncovertebral arthropathy with varying degrees of neural foraminal stenosis. No severe central narrowing. PREVERTEBRAL SOFT TISSUES: No prevertebral soft tissue swelling. LUNG APICES: Biapical pleural thickening/scarring.   OTHER FINDINGS: There are hypodense nodules in the thyroid, largest in the left lobe measuring up to 1.4 cm. There are vascular calcifications throughout the neck.         No acute intracranial hemorrhage, mass effect or midline shift.   Nonspecific scattered white matter hypodensities favored to represent sequela of small vessel ischemia. Cervical spondylosis without acute loss of vertebral body height or traumatic malalignment.   Hypodense nodules in the thyroid, largest in the left lobe measuring up to 1.4 cm. Recommend nonemergent targeted ultrasound to further characterize.   MACRO: Critical Finding:  See findings. Notification was initiated on 5/5/2024 at 5:23 am by  Evan Finkelstein.  (**-YCF-**) Instructions:   Signed by: Evan Finkelstein 5/5/2024 5:23 AM Dictation workstation:   [id number]    XR hip right with pelvis when performed 2 or 3 views    Result Date: 5/5/2024  Interpreted By:  Finkelstein, Evan, STUDY: XR HIP RIGHT WITH PELVIS WHEN PERFORMED 2 OR 3 VIEWS;  5/5/2024 4:18 am two views right hip. AP view of the pelvis.   INDICATION: Signs/Symptoms:Fall hip pain.   COMPARISON: None.   ACCESSION NUMBER(S): [id number]   ORDERING CLINICIAN: DYAN CALDERON   FINDINGS: Bones are  demineralized. There are vascular calcifications. Acute intertrochanteric fracture of the right femur with varus alignment. The lesser trochanter is displaced medially by approximately 6 mm. Moderate to severe femoroacetabular joint space narrowing with osteophytic spurring. Degenerative changes in the visualized portions of the lower lumbosacral spine. Mild left femoroacetabular joint space narrowing with osteophytic spurring.       Acute intertrochanteric fracture of the right femur with varus alignment. 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     MACRO: None.   Signed by: Evan Finkelstein 5/5/2024 5:10 AM Dictation workstation:   [id number]       Scheduled medications     Continuous medications  lactated Ringer's, 100 mL/hr, Last Rate: 100 mL/hr (05/05/24 0601)      PRN medications  PRN medications: HYDROmorphone  Results for orders placed or performed during the hospital encounter of 05/05/24 (from the past 24 hour(s))   CBC and Auto Differential   Result Value Ref Range    WBC 12.9 (H) 4.4 - 11.3 x10*3/uL    nRBC 0.0 0.0 - 0.0 /100 WBCs    RBC 3.75 (L) 4.50 - 5.90 x10*6/uL    Hemoglobin 10.4 (L) 13.5 - 17.5 g/dL    Hematocrit 33.0 (L) 41.0 - 52.0 %    MCV 88 80 - 100 fL    MCH 27.7 26.0 - 34.0 pg    MCHC 31.5 (L) 32.0 - 36.0 g/dL    RDW 12.5 11.5 - 14.5 %    Platelets 196 150 - 450 x10*3/uL    Neutrophils % 86.5 40.0 - 80.0 %    Immature Granulocytes %, Automated 0.4 0.0 - 0.9 %    Lymphocytes % 5.9 13.0 - 44.0 %    Monocytes % 6.9 2.0 - 10.0 %    Eosinophils % 0.1 0.0 - 6.0 %    Basophils % 0.2 0.0 - 2.0 %    Neutrophils Absolute 11.16 (H) 1.60 - 5.50 x10*3/uL    Immature Granulocytes Absolute, Automated 0.05 0.00 - 0.50 x10*3/uL    Lymphocytes Absolute 0.76 (L) 0.80 - 3.00 x10*3/uL    Monocytes Absolute 0.89 (H) 0.05 - 0.80 x10*3/uL    Eosinophils Absolute 0.01 0.00 - 0.40 x10*3/uL    Basophils Absolute 0.02 0.00 - 0.10 x10*3/uL   Comprehensive metabolic panel   Result Value Ref  Range    Glucose 141 (H) 74 - 99 mg/dL    Sodium 139 136 - 145 mmol/L    Potassium 3.7 3.5 - 5.3 mmol/L    Chloride 107 98 - 107 mmol/L    Bicarbonate 28 21 - 32 mmol/L    Anion Gap 8 (L) 10 - 20 mmol/L    Urea Nitrogen 43 (H) 6 - 23 mg/dL    Creatinine 1.05 0.50 - 1.30 mg/dL    eGFR 74 >60 mL/min/1.73m*2    Calcium 9.0 8.6 - 10.3 mg/dL    Albumin 3.4 3.4 - 5.0 g/dL    Alkaline Phosphatase 83 33 - 136 U/L    Total Protein 6.1 (L) 6.4 - 8.2 g/dL    AST 14 9 - 39 U/L    Bilirubin, Total 0.7 0.0 - 1.2 mg/dL    ALT 6 (L) 10 - 52 U/L   Magnesium   Result Value Ref Range    Magnesium 1.97 1.60 - 2.40 mg/dL   Lactate   Result Value Ref Range    Lactate 0.8 0.4 - 2.0 mmol/L   Protime-INR   Result Value Ref Range    Protime 33.6 (H) 9.8 - 12.8 seconds    INR 2.9 (H) 0.9 - 1.1        Assessment/Plan     73-year-old patient with severe Lewy body/Parkinson's dementia and A-fib on Eliquis with an acute right intertrochanteric hip fracture    -I had a long discussion with the patient's family at bedside this morning.  The patient is unable to participate in conversation given his mental status.  He is not presently alert or oriented.  We discussed at length the rationale for operative versus nonoperative treatment of his fracture.  Unfortunately the patient has had a significant decline in his health over the preceding months.  At this point he has been minimally ambulatory in the past few weeks given the decline in his health.  Patient had not really been utilizing a walker prior to this but has now experienced several recent falls.  We discussed the rationale for operative treatment including to potentially help the patient mobilize and prevent some of the risk associated with prolonged immobilization including but not limited to to blood clots, development of bedsores, development of pneumonia and death.  We discussed a role to potentially fix the fracture for palliative purposes as well.  It is unclear if the patient is  currently in a lot of pain related to his fracture.  I did discuss with the family that given his recent severe decline he may ultimately not go on to mobilize or walk following this injury even if we were to proceed forward with surgical stabilization of his fracture.  Family is currently leaning towards proceeding without surgery and pursuing nonoperative treatment/palliative care and hospice.  I discussed with them that I do not think this course is unreasonable particularly in light of the patient's current recent decline.  We did discuss that surgery carries with it certain risks as well and they expressed to me some concerns about exposing the patient to a general anesthetic as this could ultimately worsen his underlying dementia.  They are planning to discuss his care with his sister and will let me know if they wish to proceed forward with surgical intervention.  I will plan to call his wife later this evening for further discussion.    -C collar was removed to prevent development of sores in light of negative imaging.  Do not expect patient to be able to participate in exam to definitively clear C collar.    -Pain control  -Agree with palliative care consult, appreciate recommendations  -Nonweightbearing right lower extremity, bedrest  -Okay for diet today from orthopedic perspective  -Continue to hold Xarelto for possible surgery tomorrow should family wish to proceed that route  -Will make other services aware of surgical plans if family wishes to pursue this  -Medical management per primary  -Please contact me with any questions or concerns    Rene Salguero MD  [facility]

## 2024-05-05 NOTE — CARE PLAN
Brief Orthopedic Surgery Plan of Care Note    Spoke with patient's wife, Donna, over the phone.  Family has decided not to proceed forward with surgical intervention given patient status.  Patient will remain on bedrest with pain control per primary.  Palliative care/hospice to meet with patient and family tomorrow.  Ok to resume AC and ok for diet.  Please contact me with any questions or concerns.    Rene Salguero MD  [facility]

## 2024-05-05 NOTE — PROGRESS NOTES
Subjective     Cannot participate in examination with family members at bedside.  Pending palliative care discussion    Objective   Last Recorded Vitals  /69 (BP Location: Right arm, Patient Position: Lying)   Pulse 67   Temp 37.1 °C (98.8 °F) (Temporal)   Resp 18   Wt 53.2 kg (117 lb 4.6 oz)   SpO2 99%     Review of Systems   Unable to perform ROS: Acuity of condition       Physical Exam  Constitutional:       Appearance: He is ill-appearing.   Cardiovascular:      Rate and Rhythm: Normal rate. Rhythm irregular.      Heart sounds: Murmur heard.   Pulmonary:      Effort: No respiratory distress.      Breath sounds: Wheezing present. No rhonchi or rales.   Abdominal:      Palpations: Abdomen is soft.   Musculoskeletal:         General: Deformity present. No swelling or tenderness.      Right lower leg: No edema.      Left lower leg: No edema.   Skin:     General: Skin is dry.      Capillary Refill: Capillary refill takes less than 2 seconds.   Neurological:      General: No focal deficit present.      Mental Status: He is oriented to person, place, and time.   Psychiatric:         Mood and Affect: Mood normal.         Relevant Results  Results for orders placed or performed during the hospital encounter of 05/05/24 (from the past 24 hour(s))   CBC and Auto Differential   Result Value Ref Range    WBC 12.9 (H) 4.4 - 11.3 x10*3/uL    nRBC 0.0 0.0 - 0.0 /100 WBCs    RBC 3.75 (L) 4.50 - 5.90 x10*6/uL    Hemoglobin 10.4 (L) 13.5 - 17.5 g/dL    Hematocrit 33.0 (L) 41.0 - 52.0 %    MCV 88 80 - 100 fL    MCH 27.7 26.0 - 34.0 pg    MCHC 31.5 (L) 32.0 - 36.0 g/dL    RDW 12.5 11.5 - 14.5 %    Platelets 196 150 - 450 x10*3/uL    Neutrophils % 86.5 40.0 - 80.0 %    Immature Granulocytes %, Automated 0.4 0.0 - 0.9 %    Lymphocytes % 5.9 13.0 - 44.0 %    Monocytes % 6.9 2.0 - 10.0 %    Eosinophils % 0.1 0.0 - 6.0 %    Basophils % 0.2 0.0 - 2.0 %    Neutrophils Absolute 11.16 (H) 1.60 - 5.50 x10*3/uL    Immature  Granulocytes Absolute, Automated 0.05 0.00 - 0.50 x10*3/uL    Lymphocytes Absolute 0.76 (L) 0.80 - 3.00 x10*3/uL    Monocytes Absolute 0.89 (H) 0.05 - 0.80 x10*3/uL    Eosinophils Absolute 0.01 0.00 - 0.40 x10*3/uL    Basophils Absolute 0.02 0.00 - 0.10 x10*3/uL   Comprehensive metabolic panel   Result Value Ref Range    Glucose 141 (H) 74 - 99 mg/dL    Sodium 139 136 - 145 mmol/L    Potassium 3.7 3.5 - 5.3 mmol/L    Chloride 107 98 - 107 mmol/L    Bicarbonate 28 21 - 32 mmol/L    Anion Gap 8 (L) 10 - 20 mmol/L    Urea Nitrogen 43 (H) 6 - 23 mg/dL    Creatinine 1.05 0.50 - 1.30 mg/dL    eGFR 74 >60 mL/min/1.73m*2    Calcium 9.0 8.6 - 10.3 mg/dL    Albumin 3.4 3.4 - 5.0 g/dL    Alkaline Phosphatase 83 33 - 136 U/L    Total Protein 6.1 (L) 6.4 - 8.2 g/dL    AST 14 9 - 39 U/L    Bilirubin, Total 0.7 0.0 - 1.2 mg/dL    ALT 6 (L) 10 - 52 U/L   Magnesium   Result Value Ref Range    Magnesium 1.97 1.60 - 2.40 mg/dL   Lactate   Result Value Ref Range    Lactate 0.8 0.4 - 2.0 mmol/L   Protime-INR   Result Value Ref Range    Protime 33.6 (H) 9.8 - 12.8 seconds    INR 2.9 (H) 0.9 - 1.1       Scheduled medications  amiodarone, 100 mg, oral, q AM  amLODIPine, 5 mg, oral, q AM  carbidopa-levodopa, 1 tablet, oral, Nightly  carbidopa-levodopa, 1 tablet, oral, BID after meals  escitalopram, 10 mg, oral, q AM  hydrALAZINE, 10 mg, oral, BID  lisinopril, 40 mg, oral, q AM  melatonin, 6 mg, oral, Nightly  memantine, 10 mg, oral, BID  methIMAzole, 5 mg, oral, BID  miconazole, 1 Application, Topical, TID  polyethylene glycol, 17 g, oral, Nightly  QUEtiapine, 25 mg, oral, Nightly  [Held by provider] rivaroxaban, 15 mg, oral, q PM        Continuous medications  lactated Ringer's, 100 mL/hr, Last Rate: 100 mL/hr (05/05/24 0601)        PRN medications  PRN medications: HYDROmorphone, sennosides-docusate sodium    Assessment/Plan   73-year-old male with severe Lewy body/Parkinson's dementia paroxysmal atrial fibrillation on Eliquis presenting  with a right intertrochanteric hip fracture admitted for orthopedic and palliative care evaluation.     Intertrochanteric fracture of femur, sequela  Lewy body dementia/Parkinson's dementia  Paroxysmal atrial fibrillation on Eliquis    Palliative care and orthopedic surgery evaluations pending to determine plan of care, patient's family is considering hospice but would like to receive operative options from orthopedic surgery.  Seen by orthopedics who understand that there is a lot more risks involved and benefits to undergo the surgery and the life expectancy/prognosis is poor even then but are going to wait until the family makes a decision after discussions with palliative care and other family members.    This morning on examination tremors which seems to have worsened and unable to take his oral Parkinson medications so provided with IV Ativan 1 mg x 1.     Dilaudid for pain control. Holding home Eliquis for possible surgical intervention. Will hold DVT prophylaxis to avoid needlesticks considering the patient is DNR CC with plan to continue Eliquis as soon as possible.     -Pending family decision to determine which course of management  -Palliative care consult  -Orthopedics consult    To Cano MD   PGY 3 [facility]

## 2024-05-05 NOTE — H&P
History Of Present Illness  Barrera Almeida is a 73 y.o. male with past medical history of Parkinson's dementia/Lewy body dementia, paroxysmal atrial fibrillation for which she is on Xarelto presenting from a facility where he had a fall with subsequent severe pain.  He has severe dementia at baseline and history obtained from family at bedside as well as ED physician.  [facility] and subsequently discharged to [facility] and has been there for around 3 days.  He has had a large decline in his health over the last few months and is on comfort care at the facility.  The family states that his dementia is quite severe, he has poor oral intake but has no focal infectious symptoms and has not been complaining of any significant symptoms otherwise.    In the emergency room he was hemodynamically stable and afebrile, CT of the head and C-spine were without fractures or bleeds however a plain film of his right hip did show an acute intertrochanteric fracture of the right femur with varus alignment.  This was discussed with the family who were uncertain if they would choose to pursue operative management.  They requested admission for orthopedic evaluation and palliative care evaluation to consider hospice and/or palliative surgery.    CODE STATUS: DNR CC      Past Medical History  He has a past medical history of Parkinson disease (Multi).    Surgical History  He has no past surgical history on file.     Social History  He has no history on file for tobacco use, alcohol use, and drug use.    Family History  No family history on file.     Allergies  Venlafaxine    Constitutional: Responsive to stimuli, ANO x 0  Eyes: Clear sclera  Head/Neck: Normocephalic, atraumatic  Respiratory/Thorax: No respiratory distress  Cardiovascular: RRR  Gastrointestinal: Non-distended  Extremities: No clubbing  Skin: Warm and dry, no  jaundice      Last Recorded Vitals  /64 (BP Location: Right arm, Patient Position: Lying)   Pulse 76   Temp 37.5 °C (99.5 °F) (Temporal)   Resp 18   Wt 65.8 kg (145 lb)   SpO2 94%       73-year-old male with severe Lewy body/Parkinson's dementia paroxysmal atrial fibrillation on Eliquis presenting with a right intertrochanteric hip fracture admitted for orthopedic and palliative care evaluation.    Assessment/Plan   Principal Problem:    Intertrochanteric fracture of femur, sequela  Lewy body dementia/Parkinson's dementia  Paroxysmal atrial fibrillation on Eliquis  Palliative care and orthopedic surgery evaluations pending to determine plan of care, patient's family is considering hospice but would like to receive operative options from orthopedic surgery.  Dilaudid for pain control  Holding home Eliquis for possible surgical intervention  Will hold DVT prophylaxis to avoid needlesticks considering the patient is DNR CC with plan to continue Eliquis as soon as possible.  Will need DVT prophylaxis if the patient does undergo surgery, however will defer to day team once the plan becomes more apparent, he is anticoagulated on Eliquis currently so this should be sufficient during the decision making process tomorrow after which a decision regarding DVT ppx vs AC can be made.    Code Status: DNR-ml    Dispo: LOS > 2 MN    Plan of care was discussed extensively with patient's family and they verbalized understanding and agreement. All questions and concerns addressed upon examination.     Sheldon Bush DO  Complexity: Moderate  Total visit time = 55 minutes; more than 50% spent counseling/coordinating care  Of note, this documentation is completed using the Dragon Dictation system (voice recognition software). There may be spelling and/or grammatical errors that were not corrected prior to final submission.**      Dillan Bush DO

## 2024-05-05 NOTE — NURSING NOTE
Patient a&ox0. Patient stable this shift. Pain controlled with pain meds. Patient turned q2hrs. Family at bedside. Continues on iv fluids. Patient asleep and not unable to wake up enough to have anything by month this shift

## 2024-05-05 NOTE — PROGRESS NOTES
Per chart review, patient admitted for femur fx s/p fall at [facility]. He was at [facility] for [facility]x [facility]. He has history of Parkinsons and demntia. Family spoke with ortho earlier and were deciding on surgery vs hospice. Palliative consult pending.    Met with patient's family at bedside to introduce self. Patient's sister and son in law were present. They advised his wife went home to sleep as she was here all night. They called his daughter Omayra, [phone], who is also healthcare poa. They said they have talked and decided on hospice and asked when palliative team would be in. Explained this worker can make referral to hospice and have them call them to set up meeting. They were agreeable to that and would like referral to hospice of the [facility]. Did explain if they change mind, meeting can be cancelled.   Referral sent to [facility] and asked them to call Omayra to set up meeting per request. Physician and nursing updated.   RYAN Reese   Ventral hernia repair with mesh

## 2024-05-05 NOTE — ED PROVIDER NOTES
HPI   Chief Complaint   Patient presents with    Fall    Hip Pain       This is a 73-year-old male patient with past medical history of Lewy body dementia and Parkinson's disease who presents to the ED brought in by EMS for hip fracture.  He was involved in an unwitnessed fall, he was reported to have been helped back into bed by staff at the nursing facility, where an x-ray was performed, demonstrating right hip fracture.  At time of exam he is not finishing any meaningful history and history is obtained from EMS and nursing report.  He is a DO NOT RESUSCITATE comfort care measures only                          [place] Coma Scale Score: 8                     Patient History   Past Medical History:   Diagnosis Date    Parkinson disease (Multi)      No past surgical history on file.  No family history on file.  Social History     Tobacco Use    Smoking status: Not on file    Smokeless tobacco: Not on file   Substance Use Topics    Alcohol use: Not on file    Drug use: Not on file       Physical Exam   ED Triage Vitals [05/05/24 0309]   Temp Heart Rate Resp BP   37.5 °C (99.5 °F) 76 18 104/64      SpO2 Temp Source Heart Rate Source Patient Position   94 % Temporal Monitor Lying      BP Location FiO2 (%)     Right arm --       Physical Exam  Constitutional:       Appearance: Normal appearance.   HENT:      Head: Normocephalic and atraumatic.      Mouth/Throat:      Mouth: Mucous membranes are moist.   Eyes:      Extraocular Movements: Extraocular movements intact.   Cardiovascular:      Rate and Rhythm: Normal rate and regular rhythm.      Heart sounds: Normal heart sounds. No murmur heard.  Pulmonary:      Effort: Pulmonary effort is normal. No respiratory distress.      Breath sounds: Normal breath sounds. No wheezing.   Abdominal:      General: There is no distension.      Palpations: Abdomen is soft.      Tenderness: There is no abdominal tenderness. There is no guarding.   Musculoskeletal:         General: Signs  of injury present.      Right lower leg: No edema.      Left lower leg: No edema.   Skin:     General: Skin is warm and dry.   Neurological:      General: No focal deficit present.      Mental Status: He is alert. He is disoriented.   Psychiatric:         Mood and Affect: Mood normal.         Behavior: Behavior normal.         ED Course & MDM   Diagnoses as of 05/05/24 0628   Intertrochanteric fracture of femur, sequela       Medical Decision Making  Hemodynamically stable on arrival to the ED and somewhat mildly uncomfortable appearing.  His exam is notable for pain to range of motion of the right hip.  We will obtain plain films and CT scan of head and C-spine to evaluate for traumatic injuries.  Will also obtain preoperative labs given that he may possibly go for surgery for palliative reasons.  Laboratory studies demonstrating INR of 2.9, nonspecific leukocytosis, lactate is not elevated    Plain film of the right hip does demonstrate acute intertrochanteric fracture of the right femur.  CT scan of head and cervical spine not demonstrating any acute intracranial hemorrhage, or cervical spine fracture or malalignment.    I had a discussion with the patient's POA and family members at bedside regarding the patient's CODE STATUS and goals of care at this time.  They are not ready to make a clear discussion on hospice versus surgery at this time, and would like further discussion.  At this time I believe she would be best benefited from admission to the hospital for palliative care consultation along with orthopedic surgery consultation.    Case is discussed with Dr. Bush who accepts patient under his medicine service for further workup and management    Discussed with the attending  David Andres DO PGY-4  Emergency Medicine        Procedure  Procedures     David Andres DO  Resident  05/05/24 0628

## 2024-05-06 VITALS
TEMPERATURE: 97.9 F | HEIGHT: 69 IN | WEIGHT: 117.28 LBS | OXYGEN SATURATION: 99 % | BODY MASS INDEX: 17.37 KG/M2 | RESPIRATION RATE: 17 BRPM | HEART RATE: 66 BPM | DIASTOLIC BLOOD PRESSURE: 85 MMHG | SYSTOLIC BLOOD PRESSURE: 169 MMHG

## 2024-05-06 PROCEDURE — 2500000004 HC RX 250 GENERAL PHARMACY W/ HCPCS (ALT 636 FOR OP/ED): Performed by: INTERNAL MEDICINE

## 2024-05-06 PROCEDURE — 2500000004 HC RX 250 GENERAL PHARMACY W/ HCPCS (ALT 636 FOR OP/ED): Performed by: STUDENT IN AN ORGANIZED HEALTH CARE EDUCATION/TRAINING PROGRAM

## 2024-05-06 PROCEDURE — G0378 HOSPITAL OBSERVATION PER HR: HCPCS

## 2024-05-06 PROCEDURE — 99238 HOSP IP/OBS DSCHRG MGMT 30/<: CPT | Performed by: INTERNAL MEDICINE

## 2024-05-06 RX ORDER — LORAZEPAM 2 MG/ML
0.5 INJECTION INTRAMUSCULAR ONCE AS NEEDED
Status: DISCONTINUED | OUTPATIENT
Start: 2024-05-06 | End: 2024-05-06 | Stop reason: HOSPADM

## 2024-05-06 RX ORDER — LORAZEPAM 2 MG/ML
0.5 INJECTION INTRAMUSCULAR ONCE
Status: COMPLETED | OUTPATIENT
Start: 2024-05-06 | End: 2024-05-06

## 2024-05-06 RX ADMIN — LORAZEPAM 0.5 MG: 2 INJECTION, SOLUTION INTRAMUSCULAR; INTRAVENOUS at 09:35

## 2024-05-06 RX ADMIN — LORAZEPAM 0.5 MG: 2 INJECTION, SOLUTION INTRAMUSCULAR; INTRAVENOUS at 18:50

## 2024-05-06 RX ADMIN — KETOROLAC TROMETHAMINE 15 MG: 15 INJECTION, SOLUTION INTRAMUSCULAR; INTRAVENOUS at 09:22

## 2024-05-06 RX ADMIN — HYDROMORPHONE HYDROCHLORIDE 0.5 MG: 1 INJECTION, SOLUTION INTRAMUSCULAR; INTRAVENOUS; SUBCUTANEOUS at 13:29

## 2024-05-06 RX ADMIN — HYDROMORPHONE HYDROCHLORIDE 0.5 MG: 1 INJECTION, SOLUTION INTRAMUSCULAR; INTRAVENOUS; SUBCUTANEOUS at 18:21

## 2024-05-06 RX ADMIN — MICONAZOLE NITRATE 1 APPLICATION: 20 CREAM TOPICAL at 09:23

## 2024-05-06 RX ADMIN — KETOROLAC TROMETHAMINE 15 MG: 15 INJECTION, SOLUTION INTRAMUSCULAR; INTRAVENOUS at 02:03

## 2024-05-06 RX ADMIN — KETOROLAC TROMETHAMINE 15 MG: 15 INJECTION, SOLUTION INTRAMUSCULAR; INTRAVENOUS at 15:09

## 2024-05-06 ASSESSMENT — PAIN SCALES - PAIN ASSESSMENT IN ADVANCED DEMENTIA (PAINAD)
TOTALSCORE: 3
CONSOLABILITY: NO NEED TO CONSOLE
TOTALSCORE: MEDICATION (SEE MAR);REST;RELAXATION TECHNIQUE
TOTALSCORE: 3
BODYLANGUAGE: RELAXED
BREATHING: NORMAL
TOTALSCORE: MEDICATION (SEE MAR);RELAXATION TECHNIQUE;REST
BODYLANGUAGE: TENSE, DISTRESSED PACING, FIDGETING
FACIALEXPRESSION: FACIAL GRIMACING
CONSOLABILITY: NO NEED TO CONSOLE
FACIALEXPRESSION: SMILING OR INEXPRESSIVE
NEGVOCALIZATION: OCCASIONAL MOAN/GROAN, LOW SPEECH, NEGATIVE/DISAPPROVING QUALITY
FACIALEXPRESSION: SMILING OR INEXPRESSIVE
FACIALEXPRESSION: SAD, FRIGHTENED, FROWN
FACIALEXPRESSION: SAD, FRIGHTENED, FROWN
TOTALSCORE: EFFECTIVE
CONSOLABILITY: DISTRACTED OR REASSURED BY VOICE/TOUCH
TOTALSCORE: 4
BODYLANGUAGE: TENSE, DISTRESSED PACING, FIDGETING
TOTALSCORE: EFFECTIVE
CONSOLABILITY: NO NEED TO CONSOLE
TOTALSCORE: MEDICATION (SEE MAR)
TOTALSCORE: 1
FACIALEXPRESSION: FACIAL GRIMACING
BREATHING: OCCASIONAL LABORED BREATHING, SHORT PERIOD OF HYPERVENTILATION
BREATHING: OCCASIONAL LABORED BREATHING, SHORT PERIOD OF HYPERVENTILATION
CONSOLABILITY: NO NEED TO CONSOLE
NEGVOCALIZATION: OCCASIONAL MOAN/GROAN, LOW SPEECH, NEGATIVE/DISAPPROVING QUALITY
BREATHING: OCCASIONAL LABORED BREATHING, SHORT PERIOD OF HYPERVENTILATION
BODYLANGUAGE: RELAXED
TOTALSCORE: MEDICATION (SEE MAR);REST;RELAXATION TECHNIQUE;EMOTIONAL SUPPORT
TOTALSCORE: 0
BODYLANGUAGE: TENSE, DISTRESSED PACING, FIDGETING
TOTALSCORE: MEDICATION (SEE MAR);REST;RELAXATION TECHNIQUE
BODYLANGUAGE: RELAXED
BREATHING: NORMAL
CONSOLABILITY: NO NEED TO CONSOLE
TOTALSCORE: 6
BREATHING: OCCASIONAL LABORED BREATHING, SHORT PERIOD OF HYPERVENTILATION
NEGVOCALIZATION: OCCASIONAL MOAN/GROAN, LOW SPEECH, NEGATIVE/DISAPPROVING QUALITY

## 2024-05-06 ASSESSMENT — COGNITIVE AND FUNCTIONAL STATUS - GENERAL
WALKING IN HOSPITAL ROOM: TOTAL
STANDING UP FROM CHAIR USING ARMS: TOTAL
MOBILITY SCORE: 6
DRESSING REGULAR UPPER BODY CLOTHING: TOTAL
EATING MEALS: TOTAL
HELP NEEDED FOR BATHING: TOTAL
MOVING FROM LYING ON BACK TO SITTING ON SIDE OF FLAT BED WITH BEDRAILS: TOTAL
DRESSING REGULAR LOWER BODY CLOTHING: TOTAL
TURNING FROM BACK TO SIDE WHILE IN FLAT BAD: TOTAL
PERSONAL GROOMING: TOTAL
MOVING TO AND FROM BED TO CHAIR: TOTAL
TOILETING: TOTAL
DAILY ACTIVITIY SCORE: 6
CLIMB 3 TO 5 STEPS WITH RAILING: TOTAL

## 2024-05-06 NOTE — NURSING NOTE
EOS:  Patient not alert, does respond to pain but does not follow commands.  No signs of pain or discomfort.  IVF's d/c'd as ordered.  Kept NPO d/t mental status, provider notified that he has not received any of his p.o. meds.  No family at bedside at this time, plan for hospice meeting today.

## 2024-05-06 NOTE — PROGRESS NOTES
05/06/24 1439   Discharge Planning   Patient expects to be discharged to: [facility]   Does the patient need discharge transport arranged? Yes   RoundTrip coordination needed? Yes     Per [facility] nurse, patient to d/c to [facility] today.

## 2024-05-06 NOTE — PROGRESS NOTES
Spiritual Care Visit    Clinical Encounter Type  Visited With: Patient and family together  Routine Visit: Introduction  Continue Visiting: Yes    Nondenominational Encounters  Nondenominational Needs: Prayer, Nondenominational articles         Sacramental Encounters  Communion: Patient is currently unable  Communion Given Indicator: No  Sacrament of Sick-Anointing: Anointed, Family requested anointing                             Taxonomy  Intended Effects: Establish rapport and connectedness, Rossy affirmation, Build relationship of care and support, Helping someone feel comforted, Convey a calming presence, Promote sense of peace, Lessen anxiety, Demonstrate caring and concern    I administered the last anointing to the patient, who seemed unaware.

## 2024-05-06 NOTE — NURSING NOTE
1940: Pt transport pushed back until 2130. This RN made pt's family at bedside aware.     2046: Pt leaving with transport. Family at bedside and leaving with pt. Pt vitals stable and pt has remained comfortable.

## 2024-05-06 NOTE — DISCHARGE SUMMARY
Discharge Diagnosis  Intertrochanteric fracture of femur, sequela    Issues Requiring Follow-Up  Discharged to hospice facility    Discharge Meds     Your medication list        CONTINUE taking these medications        Instructions Last Dose Given Next Dose Due   acetaminophen 325 mg tablet  Commonly known as: Tylenol           amiodarone 100 mg tablet  Commonly known as: Pacerone           amLODIPine 5 mg tablet  Commonly known as: Norvasc           carbidopa-levodopa  mg tablet  Commonly known as: Sinemet           carbidopa-levodopa  mg ER tablet  Commonly known as: Sinemet CR           escitalopram 10 mg tablet  Commonly known as: Lexapro           ferrous sulfate (325 mg ferrous sulfate) tablet           hydrALAZINE 10 mg tablet  Commonly known as: Apresoline           lisinopril 40 mg tablet           melatonin 3 mg tablet           memantine 10 mg tablet  Commonly known as: Namenda           methIMAzole 5 mg tablet  Commonly known as: Tapazole           miconazole 2 % cream  Commonly known as: Micotin           polyethylene glycol 17 gram packet  Commonly known as: Glycolax, Miralax           QUEtiapine 25 mg tablet  Commonly known as: SEROquel           rivaroxaban 15 mg tablet  Commonly known as: Xarelto           sennosides-docusate sodium 8.6-50 mg tablet  Commonly known as: Lizzeth-Colace                    Test Results Pending At Discharge  Pending Labs       No current pending labs.            Hospital Course  Barrera Almeida is a 73 y.o. male with past medical history of Parkinson's dementia/Lewy body dementia, paroxysmal atrial fibrillation for which she is on Xarelto presenting from a facility where he had a fall with subsequent severe pain.       In the emergency room he was hemodynamically stable and afebrile, CT of the head and C-spine were without fractures or bleeds however a plain film of his right hip did show an acute intertrochanteric fracture of the right femur with varus  alignment.  This was discussed with the family who were uncertain if they would choose to pursue operative management.  They requested admission for orthopedic evaluation and palliative care evaluation to consider hospice and/or palliative surgery.    Orthopedic surgery consulted and patient family elected not to proceed with the surgery and to meet with hospice, had a hospice meeting today and family signed with hospice, was receiving Dilaudid and Ativan as needed for pain and tremor in the hospital, will be discharged today to [facility] .    Pertinent Physical Exam At Time of Discharge  Physical Exam    Constitutional: Responsive to painful stimuli, ANO x 0, in no distress after receiving pain medication and Ativan   eyes: Clear sclera  Head/Neck: Normocephalic, atraumatic  Respiratory/Thorax: No respiratory distress  Cardiovascular: RRR  Gastrointestinal: Non-distended  Extremities: No clubbing  Skin: Warm and dry, no jaundice   Outpatient Follow-Up  No future appointments.      Dean Avila MD

## 2024-05-06 NOTE — NURSING NOTE
0700: Assumed care of pt. At this time. Pt. Appears comfortable and resting in the bed however night shift RN reports that the last few hours the pt. Has been more restless. Will continue to monitor this pt. Throughout the duration of this shift and await meeting with palliative/hospice today.    0930: Pt. Having frequent tremors that are consistent and long lasting. Message sent to Dr. Avila to make her aware he is unable to take his medications as he is not following commands. Will follow for new orders.    1345: Hospice meeting underway at the bedside at this time. The pt. Comfortably resting and no further needs at this time. Will follow for outcome of the meeting.    1430: [facility] nurse, Jeanne, let staff know that the pt. Will have a bed at [facility] [place] and that he will be transferred later this shift. Will follow for transport time. Family remains at the bedside and visiting with the pt. He appears comfortable at this time.    1900: Pt. Medicated with dilaudid and ativan to help with restlessness and grimacing for ride. The pt. Is set for transport at 1930 to go to [facility]. Family at the bedside and made aware. No further questions or concerns. Call light in reach and bed in lowest position. Will follow until DC.

## 2024-05-06 NOTE — CONSULTS
Reason For Consult  Goals of care    History Of Present Illness  Barrera Almeida is a 73 y.o. male presenting with past medical history of Parkinson's dementia/Lewy body dementia, paroxysmal atrial fibrillation for which she is on Xarelto presenting from a facility where he had a fall with subsequent severe pain.  He has severe dementia at baseline. [facility] and subsequently discharged to [facility] and has been there for around 3 days. He has had a large decline in his health over the last few months and is on comfort care at the facility. The family states that his dementia is quite severe, he has poor oral intake but has no focal infectious symptoms and has not been complaining of any significant symptoms otherwise.      Assessment/Plan     Palliative care consulted for goals of care as family requested a hospice referral to [facility]. Over the weekend, SW sent referral to [facility], meeting arranged for today 3/6 at 130p bedside with [facility] RN and pts family.     Family signed consents for hospice. Plan for pt to discharge to [facility] this evening. Team updated on plan of care. Hospice RN to arrange transport.        Hailey Barrett RN

## 2024-05-07 NOTE — CARE PLAN
The patient's goals for the shift include unable to answer.    The clinical goals for the shift include See care plan